# Patient Record
Sex: MALE | Race: OTHER | HISPANIC OR LATINO
[De-identification: names, ages, dates, MRNs, and addresses within clinical notes are randomized per-mention and may not be internally consistent; named-entity substitution may affect disease eponyms.]

---

## 2020-09-03 PROBLEM — Z00.00 ENCOUNTER FOR PREVENTIVE HEALTH EXAMINATION: Status: ACTIVE | Noted: 2020-09-03

## 2020-11-06 ENCOUNTER — RESULT REVIEW (OUTPATIENT)
Age: 50
End: 2020-11-06

## 2020-12-02 ENCOUNTER — APPOINTMENT (OUTPATIENT)
Dept: PULMONOLOGY | Facility: CLINIC | Age: 50
End: 2020-12-02
Payer: MEDICAID

## 2020-12-02 VITALS
HEART RATE: 72 BPM | BODY MASS INDEX: 41.83 KG/M2 | WEIGHT: 276 LBS | SYSTOLIC BLOOD PRESSURE: 120 MMHG | RESPIRATION RATE: 12 BRPM | DIASTOLIC BLOOD PRESSURE: 90 MMHG | HEIGHT: 68 IN

## 2020-12-02 DIAGNOSIS — I10 ESSENTIAL (PRIMARY) HYPERTENSION: ICD-10-CM

## 2020-12-02 DIAGNOSIS — K21.9 GASTRO-ESOPHAGEAL REFLUX DISEASE W/OUT ESOPHAGITIS: ICD-10-CM

## 2020-12-02 DIAGNOSIS — R06.83 SNORING: ICD-10-CM

## 2020-12-02 PROCEDURE — 99203 OFFICE O/P NEW LOW 30 MIN: CPT

## 2020-12-02 PROCEDURE — 99072 ADDL SUPL MATRL&STAF TM PHE: CPT

## 2020-12-02 NOTE — PHYSICAL EXAM
[Neck Appearance] : the appearance of the neck was normal [Jugular Venous Distention Increased] : there was no jugular-venous distention [Heart Sounds] : normal S1 and S2 [Murmurs] : no murmurs [] : no respiratory distress [Auscultation Breath Sounds / Voice Sounds] : lungs were clear to auscultation bilaterally [No Focal Deficits] : no focal deficits [Oriented To Time, Place, And Person] : oriented to person, place, and time [Affect] : the affect was normal [FreeTextEntry2] : no edema

## 2020-12-02 NOTE — HISTORY OF PRESENT ILLNESS
[FreeTextEntry1] : Dr. Mayer\par \par 50 year old man  with history of htn is here in the sleep center to address excessive snoring and restless sleep.  Patient is sleepy with [place] sleepiness score of 12.  Patient has very loud snoring, does not have any witnessed apneas.  Patient's bedtime is around 10 PM wakes up in the morning around 5 AM.  He feels rested when he wakes up.  Patient drinks 1 cup of coffee during the daytime patient does not have any headaches or nocturia.  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

## 2020-12-02 NOTE — ASSESSMENT
[FreeTextEntry1] : Patient has symptoms suggestive of sleep apnea. Will order a sleep study. Discussed about details of the study and possible treatment options.\par He is being evaluated for bariatric surgery, further recommendations about devante op care after the sleep study.\par

## 2021-01-20 ENCOUNTER — APPOINTMENT (OUTPATIENT)
Dept: PULMONOLOGY | Facility: CLINIC | Age: 51
End: 2021-01-20
Payer: MEDICAID

## 2021-01-20 VITALS
SYSTOLIC BLOOD PRESSURE: 120 MMHG | BODY MASS INDEX: 41.68 KG/M2 | WEIGHT: 275 LBS | HEIGHT: 68 IN | HEART RATE: 71 BPM | DIASTOLIC BLOOD PRESSURE: 80 MMHG

## 2021-01-20 PROCEDURE — 99214 OFFICE O/P EST MOD 30 MIN: CPT

## 2021-01-20 PROCEDURE — 99072 ADDL SUPL MATRL&STAF TM PHE: CPT

## 2021-01-20 NOTE — PHYSICAL EXAM
[Heart Sounds] : normal S1 and S2 [Murmurs] : no murmurs [] : no respiratory distress [Auscultation Breath Sounds / Voice Sounds] : lungs were clear to auscultation bilaterally [No Focal Deficits] : no focal deficits [Oriented To Time, Place, And Person] : oriented to person, place, and time [Affect] : the affect was normal [Neck Appearance] : the appearance of the neck was normal [Jugular Venous Distention Increased] : there was no jugular-venous distention [FreeTextEntry2] : no edema

## 2021-01-20 NOTE — ASSESSMENT
[FreeTextEntry1] : 50-year-old man with mild obstructive sleep apnea and excessive daytime sleepiness, patient is going for bariatric surgery.\par \par Today I discussed the results in detail and patient agreed to go on CPAP therapy.  I also asked the patient to bring the CPAP machine for the surgery as they can use it in the postop.  The degree of apnea can get worse due to anesthesia and pain management.\par \par Once enough weight loss is achieved we can stop the CPAP therapy.

## 2021-01-20 NOTE — HISTORY OF PRESENT ILLNESS
[FreeTextEntry1] : Dr. Mayer\par \par 50 year old man  with history of htn, obesity, lisa is here in the sleep center to address sleep apnea.  Patient is sleepy with [place] sleepiness score of 12.  Patient has very loud snoring.  Patient's bedtime is around 10 PM wakes up in the morning around 5 AM.  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  Today I discussed the results in detail with  present and he agrees to go on CPAP therapy.\par

## 2021-01-25 ENCOUNTER — RESULT REVIEW (OUTPATIENT)
Age: 51
End: 2021-01-25

## 2021-02-08 ENCOUNTER — TRANSCRIPTION ENCOUNTER (OUTPATIENT)
Age: 51
End: 2021-02-08

## 2021-05-03 ENCOUNTER — APPOINTMENT (OUTPATIENT)
Dept: PULMONOLOGY | Facility: HOSPITAL | Age: 51
End: 2021-05-03

## 2021-05-17 ENCOUNTER — APPOINTMENT (OUTPATIENT)
Dept: PULMONOLOGY | Facility: HOSPITAL | Age: 51
End: 2021-05-17
Payer: MEDICAID

## 2021-05-17 VITALS
DIASTOLIC BLOOD PRESSURE: 84 MMHG | HEIGHT: 68 IN | HEART RATE: 77 BPM | BODY MASS INDEX: 37.44 KG/M2 | SYSTOLIC BLOOD PRESSURE: 140 MMHG | WEIGHT: 247 LBS

## 2021-05-17 PROCEDURE — 99213 OFFICE O/P EST LOW 20 MIN: CPT

## 2021-05-17 RX ORDER — LOSARTAN POTASSIUM 100 MG/1
TABLET, FILM COATED ORAL
Refills: 0 | Status: DISCONTINUED | COMMUNITY
End: 2021-05-17

## 2021-05-17 NOTE — HISTORY OF PRESENT ILLNESS
[FreeTextEntry1] : Dr. Mayer\par \par 50 year old man  with history of htn, obesity s/p bariatric surgery may 2021, lisa is here in the sleep center to address sleep apnea. He is on CPAP for mild sleep apnea.  Patient is less sleepy with CPAP and snoring improved.  Patient's bedtime is around 10 PM wakes up in the morning around 5 AM.  \par \par He is from [place] moved to [facility] 15 yrs ago, able to speak some english and converse (more comfortable in Sinhala)\par \par Patient underwent sleep study which showed mild obstructive sleep apnea.\par \par AHI 0.5\par pressure 9 cm\par usage 6 hrs\par dme apria\par uses fullface mask\par on airsense 10 machine

## 2021-05-17 NOTE — ASSESSMENT
[FreeTextEntry1] : 50 year  old man  with sleep apnea is doing well with the CPAP.  Patient is compliant with the CPAP and benefited significantly with the CPAP.\par

## 2021-05-17 NOTE — PHYSICAL EXAM
[General Appearance - Well Developed] : well developed [General Appearance - Well Nourished] : well nourished [III] : III [Heart Sounds] : normal S1 and S2 [Murmurs] : no murmurs [] : no respiratory distress [Auscultation Breath Sounds / Voice Sounds] : lungs were clear to auscultation bilaterally [No Focal Deficits] : no focal deficits [Oriented To Time, Place, And Person] : oriented to person, place, and time [FreeTextEntry1] : wounds look clean

## 2021-11-02 ENCOUNTER — APPOINTMENT (OUTPATIENT)
Dept: PULMONOLOGY | Facility: CLINIC | Age: 51
End: 2021-11-02
Payer: MEDICAID

## 2021-11-02 VITALS
HEIGHT: 68 IN | HEART RATE: 75 BPM | SYSTOLIC BLOOD PRESSURE: 130 MMHG | BODY MASS INDEX: 29.25 KG/M2 | DIASTOLIC BLOOD PRESSURE: 80 MMHG | WEIGHT: 193 LBS

## 2021-11-02 DIAGNOSIS — G47.33 OBSTRUCTIVE SLEEP APNEA (ADULT) (PEDIATRIC): ICD-10-CM

## 2021-11-02 PROCEDURE — 99213 OFFICE O/P EST LOW 20 MIN: CPT

## 2021-11-02 NOTE — ASSESSMENT
[FreeTextEntry1] : 51-year-old man underwent bariatric surgery and lost significant weight.  Patient is currently still on the CPAP.\par \par I asked him to stop using the CPAP and we will repeat a sleep study to see if he continues to have sleep apnea.  We can stop the machine if the sleep apnea resolved.

## 2021-11-02 NOTE — HISTORY OF PRESENT ILLNESS
[FreeTextEntry1] : Abeba Aguilar\par \par 51 year old man  with history of htn, obesity s/p bariatric surgery may 2021, lisa is here in the sleep center to address sleep apnea. He is on CPAP for mild sleep apnea.  Patient is less sleepy with CPAP and snoring improved.  Patient's bedtime is around 10 PM wakes up in the morning around 5 AM.  \par \par He is from [place] moved to [facility] 15 yrs ago, able to speak some english and converse (more comfortable in Portuguese)\par \par Patient underwent sleep study which showed mild obstructive sleep apnea.\par \par AHI 0.5\par pressure 9 cm\par usage 6 hrs\par dme apria\par uses fullface mask\par on airsense 10 machine\par \par He lost significant weight after bariatric surgery, will repeat study and stop cpap if the apnea resolved.

## 2022-08-08 ENCOUNTER — APPOINTMENT (OUTPATIENT)
Dept: VASCULAR SURGERY | Facility: CLINIC | Age: 52
End: 2022-08-08

## 2022-08-08 ENCOUNTER — NON-APPOINTMENT (OUTPATIENT)
Age: 52
End: 2022-08-08

## 2022-08-08 VITALS — HEART RATE: 70 BPM | SYSTOLIC BLOOD PRESSURE: 122 MMHG | DIASTOLIC BLOOD PRESSURE: 82 MMHG

## 2022-08-08 DIAGNOSIS — I83.892 VARICOSE VEINS OF LEFT LOWER EXTREMITY WITH OTHER COMPLICATIONS: ICD-10-CM

## 2022-08-08 DIAGNOSIS — I87.2 VENOUS INSUFFICIENCY (CHRONIC) (PERIPHERAL): ICD-10-CM

## 2022-08-08 PROCEDURE — 99204 OFFICE O/P NEW MOD 45 MIN: CPT

## 2022-08-08 PROCEDURE — 93971 EXTREMITY STUDY: CPT

## 2022-08-08 RX ORDER — OMEPRAZOLE 40 MG/1
CAPSULE, DELAYED RELEASE ORAL
Refills: 0 | Status: DISCONTINUED | COMMUNITY
End: 2022-08-08

## 2022-08-08 RX ORDER — TAMSULOSIN HYDROCHLORIDE 0.4 MG/1
0.4 CAPSULE ORAL
Refills: 0 | Status: DISCONTINUED | COMMUNITY
End: 2022-08-08

## 2022-08-09 PROBLEM — I87.2 VENOUS INSUFFICIENCY OF LEFT LOWER EXTREMITY: Status: ACTIVE | Noted: 2022-08-09

## 2022-08-09 PROBLEM — I83.892 SYMPTOMATIC VARICOSE VEINS, LEFT: Status: ACTIVE | Noted: 2022-08-09

## 2022-08-09 NOTE — HISTORY OF PRESENT ILLNESS
[FreeTextEntry1] : 53 yo male presents to the office with a chief complaint of pain in his left lower extremity secondary to varicose veins. He reports that he lost approximately 100 lbs after undergo a gastric bypass. He reports that the veins have become increasingly prominent. He denies a history of DVT or SVT. He does not currently wear compression stockings.

## 2022-08-09 NOTE — REASON FOR VISIT
[Initial Evaluation] : an initial evaluation [FreeTextEntry1] : prominent varicose veins left lower extremity

## 2022-08-09 NOTE — CONSULT LETTER
[Dear  ___] : Dear  [unfilled], [Consult Letter:] : I had the pleasure of evaluating your patient, [unfilled]. [Please see my note below.] : Please see my note below. [Consult Closing:] : Thank you very much for allowing me to participate in the care of this patient.  If you have any questions, please do not hesitate to contact me. [Sincerely,] : Sincerely, [FreeTextEntry2] : Abeba Mayer [FreeTextEntry3] : Hussein Solis MD, RPVI\par Chief, Vascular Surgery\par

## 2022-08-09 NOTE — REVIEW OF SYSTEMS
[Fever] : no fever [Chills] : no chills [Lower Ext Edema] : no extremity edema [Limb Pain] : limb pain [Limb Swelling] : no limb swelling

## 2022-08-09 NOTE — PHYSICAL EXAM
[JVD] : no jugular venous distention  [Normal Breath Sounds] : Normal breath sounds [Normal Rate and Rhythm] : normal rate and rhythm [2+] : left 2+ [Ankle Swelling (On Exam)] : not present [Ankle Swelling On The Right] : mild [Varicose Veins Of The Left Leg] : of the left leg [Varicose Veins Of Lower Extremities] : present [Ankle Swelling On The Left] : moderate [] : bilaterally [Alert] : alert [Oriented to Person] : oriented to person [Oriented to Place] : oriented to place [Oriented to Time] : oriented to time [de-identified] : Awake and Alert [de-identified] : prominent varicose veins left calf [de-identified] : No gross motor or sensory deficits [de-identified] : Appropriate affect

## 2022-08-09 NOTE — ASSESSMENT
[FreeTextEntry1] : 52 year old  male with right lower extremity pain and swelling associated with varicose veins which worsens through out the day. He  underwent a venous duplex which demonstrated no DVT or SVT. He has venous insufficiency. I recommended that he obtain and begin to wear a compression stocking. He will elevate his legs as much as possible. He will follow up with me in several months to check on the efficacy of this treatment.\par \par \par

## 2023-04-25 ENCOUNTER — APPOINTMENT (OUTPATIENT)
Dept: UROLOGY | Facility: CLINIC | Age: 53
End: 2023-04-25
Payer: MEDICAID

## 2023-04-25 DIAGNOSIS — N40.1 BENIGN PROSTATIC HYPERPLASIA WITH LOWER URINARY TRACT SYMPMS: ICD-10-CM

## 2023-04-25 DIAGNOSIS — R33.9 RETENTION OF URINE, UNSPECIFIED: ICD-10-CM

## 2023-04-25 PROCEDURE — 99203 OFFICE O/P NEW LOW 30 MIN: CPT

## 2023-04-25 RX ORDER — TAMSULOSIN HYDROCHLORIDE 0.4 MG/1
0.4 CAPSULE ORAL
Refills: 0 | Status: ACTIVE | COMMUNITY

## 2023-04-26 NOTE — PHYSICAL EXAM
[General Appearance - Well Developed] : well developed [Normal Appearance] : normal appearance [General Appearance - In No Acute Distress] : no acute distress [Respiration, Rhythm And Depth] : normal respiratory rhythm and effort [Abdomen Soft] : soft [Costovertebral Angle Tenderness] : no ~M costovertebral angle tenderness [Urethral Meatus] : meatus normal [Penis Abnormality] : normal uncircumcised penis [Scrotum] : the scrotum was normal [Epididymis] : the epididymides were normal [Testes Tenderness] : no tenderness of the testes [Testes Mass (___cm)] : there were no testicular masses [Anus Abnormality] : the anus and perineum were normal [Rectal Exam - Rectum] : digital rectal exam was normal [Prostate Tenderness] : the prostate was not tender [No Prostate Nodules] : no prostate nodules [Prostate Size ___ gm] : prostate size [unfilled] gm [] : no rash [Oriented To Time, Place, And Person] : oriented to person, place, and time [Inguinal Lymph Nodes Enlarged Bilaterally] : inguinal

## 2023-04-26 NOTE — ASSESSMENT
[FreeTextEntry1] : Patient is a 52 year male who presents with decreased stream, frequency, urgency and nocturia 2-3x/night.  He had noted symptoms worsening over the last year.  \par no associated pain.  no associated hematuria, dysuria or incontinence.  no modifying factors.\par His PCP started Tamsulosin 0.4 mg QD with good results.\par PSA is 1.44 12/22 and ROMAN is normal.\par \par A/P\par 1. BPH with obstruction and LTUS\par 2. prostate cancer screening\par continue Flomax \par office 1 year

## 2024-04-04 ENCOUNTER — APPOINTMENT (OUTPATIENT)
Dept: ENDOCRINOLOGY | Facility: CLINIC | Age: 54
End: 2024-04-04